# Patient Record
Sex: MALE | Race: WHITE | NOT HISPANIC OR LATINO | Employment: FULL TIME | URBAN - METROPOLITAN AREA
[De-identification: names, ages, dates, MRNs, and addresses within clinical notes are randomized per-mention and may not be internally consistent; named-entity substitution may affect disease eponyms.]

---

## 2019-08-28 ENCOUNTER — TRANSCRIBE ORDERS (OUTPATIENT)
Dept: ADMINISTRATIVE | Age: 49
End: 2019-08-28

## 2019-08-28 DIAGNOSIS — Z13.0 SCREENING FOR IRON DEFICIENCY ANEMIA: ICD-10-CM

## 2019-08-28 DIAGNOSIS — R73.01 IMPAIRED FASTING GLUCOSE: Primary | ICD-10-CM

## 2019-08-28 DIAGNOSIS — Z13.6 SCREENING FOR ISCHEMIC HEART DISEASE: ICD-10-CM

## 2019-08-30 ENCOUNTER — APPOINTMENT (OUTPATIENT)
Dept: LAB | Age: 49
End: 2019-08-30
Payer: COMMERCIAL

## 2019-08-30 DIAGNOSIS — Z13.0 SCREENING FOR IRON DEFICIENCY ANEMIA: ICD-10-CM

## 2019-08-30 DIAGNOSIS — R73.01 IMPAIRED FASTING GLUCOSE: ICD-10-CM

## 2019-08-30 DIAGNOSIS — Z13.6 SCREENING FOR ISCHEMIC HEART DISEASE: ICD-10-CM

## 2019-09-25 ENCOUNTER — EVALUATION (OUTPATIENT)
Dept: PHYSICAL THERAPY | Age: 49
End: 2019-09-25
Payer: COMMERCIAL

## 2019-09-25 ENCOUNTER — TRANSCRIBE ORDERS (OUTPATIENT)
Dept: PHYSICAL THERAPY | Age: 49
End: 2019-09-25

## 2019-09-25 DIAGNOSIS — M75.02 ADHESIVE CAPSULITIS OF LEFT SHOULDER: Primary | ICD-10-CM

## 2019-09-25 DIAGNOSIS — M75.02 ADHESIVE BURSITIS OF LEFT SHOULDER: Primary | ICD-10-CM

## 2019-09-25 PROCEDURE — 97161 PT EVAL LOW COMPLEX 20 MIN: CPT

## 2019-09-25 NOTE — LETTER
2019    [facility] [address]    Patient: Tori Zepeda   YOB: 1970   Date of Visit: 2019     Encounter Diagnosis     ICD-10-CM    1  Adhesive capsulitis of left shoulder M75 02        Dear Dr Lazaro Pimentel: Thank you for your recent referral of Tori Zepeda  Please review the attached evaluation summary from Sesar's recent visit  Please verify that you agree with the plan of care by signing the attached order  If you have any questions or concerns, please do not hesitate to call  I sincerely appreciate the opportunity to share in the care of one of your patients and hope to have another opportunity to work with you in the near future  Sincerely,    Sherri Rodrigues, PT      Referring Provider:      I certify that I have read the below Plan of Care and certify the need for these services furnished under this plan of treatment while under my care  Jewels Suggs DO  [address]  [place]: [phone]          PT Evaluation     Today's date: 2019  Patient name: Tori Zepeda  : 1970  MRN: [id number]  Referring provider: Radha Villarreal DO  Dx:   Encounter Diagnosis     ICD-10-CM    1  Adhesive capsulitis of left shoulder M75 02                   Assessment  Assessment details: Patient is a 52 y o  Male reporting to PT with complaints of L shoulder stiffness/pain  He presents with significantly limited L shoulder AROM (ER>ABD>IR), pain with end-range movements, and impaired ability to perform ADL's  No red flags present  L shoulder strength normal  Overall, presentation is consistent with adhesive capsulitis of the L shoulder  At this time, patient will benefit from skilled PT intervention to address the impairments listed     Impairments: abnormal or restricted ROM, abnormal movement, activity intolerance, lacks appropriate home exercise program, pain with function and poor body mechanics    Symptom irritability: moderateBarriers to therapy: None  Understanding of Dx/Px/POC: good   Prognosis: good    Goals  STG's: 4 Weeks  1 ) Patient will be independent with HEP   2 ) Patient will be able to reach for a overhead with <3/10 pain  3 ) Patient will be able to don/doff jacket without difficulty  4 ) Patient will be able to reach behind their back with <3/10 pain  5 ) Patient will be able to sleep on affected side throughout the night without waking  LTG's: 8 Weeks  1 ) Patient will demonstrate full shoulder AROM in all planes when compared to the uninvolved side  2 ) Patient will be able to lift 10-15 lbs overhead using affected arm with <3/10 pain  3 ) Patient will be able to perform work and/or recreational ADL's without difficulty  4 ) Patient will exceed predicted FOTO score  5 ) Patient will be able to perform all household chores without difficulty  Plan  Plan details: Despite recommendation for skilled PT, patient has deferred formal tx and will manage sx's independently via HEP  Patient has been encouraged to follow-up with PT if sx's do no improve over the next month or so  Patient was educated regarding the etiology and pathomechanics of their condition  They demonstrated understanding verbally  Patient was provided with an HEP  They were educated regarding repetitions, resistance, and proper technique  Patient demonstrated understanding verbally  Patient would benefit from: skilled physical therapy  Treatment plan discussed with: patient        Subjective Evaluation    History of Present Illness  Mechanism of injury: Patient is a 52 y o  Male reporting to PT with complaints of L shoulder pain  Onset ~1 year ago without incidence of trauma  Sx's have progressively worsened over that time  Patient was seen by PCP who referred him to PT  No imaging performed at this time  Sx's include lost AROM and pain   Patient denies presence of N/T extending down the arm or neck pain  No pain at rest, but significant onset with reaching behind the back  Greatest impairment is the patient's inability to abduct >90 degrees or fully reach behind his back due to stiffness  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  Patient denies significant PmHx verbally    Patient consented to manual therapy and physical examination  Demonstrated understanding verbally          Quality of life: poor    Pain  Current pain ratin  At best pain ratin  At worst pain rating: 3  Location: L Shoulder   Quality: discomfort  Relieving factors: change in position, relaxation, rest and support  Aggravating factors: lifting and overhead activity    Patient Goals  Patient goals for therapy: increased motion, decreased pain, increased strength and independence with ADLs/IADLs          Objective     Concurrent Complaints  Negative for night pain, disturbed sleep, dizziness, faints, headaches, nausea/motion sickness, tinnitus, trouble swallowing, difficulty breathing, shortness of breath, respiratory pain, visual change, cardiac problem, kidney problem, gallbladder problem, stomach problem, ulcer, appendix problem, spleen problem, pancreas problem, history of cancer, history of trauma and infection    Palpation     Additional Palpation Details  No point tenderness with palpation of shoulder musculature or bony structures      Neurological Testing     Sensation   Cervical/Thoracic   Left   Intact: light touch    Right   Intact: light touch    Reflexes   Left   Biceps (C5/C6): normal (2+)    Right   Biceps (C5/C6): normal (2+)    Active Range of Motion   Left Shoulder   Flexion: 134 degrees   Abduction: 100 degrees   External rotation 0°:  60 degrees   Internal rotation BTB: L1     Right Shoulder   Flexion: 160 degrees   Abduction: 165 degrees   External rotation 0°:  80 degrees   Internal rotation BTB: T10     Passive Range of Motion   Left Shoulder   Flexion: 140 degrees   Abduction: 100 degrees   External rotation 90°:  50 degrees   Internal rotation 90°:  45 degrees     Strength/Myotome Testing   Cervical Spine     Left   Interossei strength (t1): 5    Right   Interossei strength (t1): 5    Left Shoulder     Planes of Motion   Flexion: 5   Abduction: 5   External rotation at 0°:  5   Internal rotation at 0°:  5     Isolated Muscles   Upper trapezius: 5     Right Shoulder     Planes of Motion   Flexion: 5   Abduction: 5   External rotation at 0°:  5   Internal rotation at 0°:  5     Isolated Muscles   Upper trapezius: 5     Left Elbow   Flexion: 5  Extension: 5    Right Elbow   Flexion: 5  Extension: 5    Left Wrist/Hand   Wrist extension: 5  Wrist flexion: 5  Thumb extension: 5    Right Wrist/Hand   Wrist extension: 5  Wrist flexion: 5  Thumb extension: 5    Tests     Left Shoulder   Negative drop arm, empty can, Hawkin's and Neer's       Additional Tests Details  No pain with resisted ER    General Comments:    Upper quarter screen   Shoulder: unremarkable  Elbow: unremarkable  Hand/wrist: unremarkable  Neuro Exam:     Headaches   Patient reports headaches: No        Flowsheet Rows      Most Recent Value   PT/OT G-Codes   Current Score  66   Projected Score  77             Precautions: None

## 2019-09-25 NOTE — PROGRESS NOTES
PT Evaluation     Today's date: 2019  Patient name: Brandi Baron  : 1970  MRN: [id number]  Referring provider: Shanika Vanegas DO  Dx:   Encounter Diagnosis     ICD-10-CM    1  Adhesive capsulitis of left shoulder M75 02                   Assessment  Assessment details: Patient is a 52 y o  Male reporting to PT with complaints of L shoulder stiffness/pain  He presents with significantly limited L shoulder AROM (ER>ABD>IR), pain with end-range movements, and impaired ability to perform ADL's  No red flags present  L shoulder strength normal  Overall, presentation is consistent with adhesive capsulitis of the L shoulder  At this time, patient will benefit from skilled PT intervention to address the impairments listed  Impairments: abnormal or restricted ROM, abnormal movement, activity intolerance, lacks appropriate home exercise program, pain with function and poor body mechanics    Symptom irritability: moderateBarriers to therapy: None  Understanding of Dx/Px/POC: good   Prognosis: good    Goals  STG's: 4 Weeks  1 ) Patient will be independent with HEP   2 ) Patient will be able to reach for a overhead with <3/10 pain  3 ) Patient will be able to don/doff jacket without difficulty  4 ) Patient will be able to reach behind their back with <3/10 pain  5 ) Patient will be able to sleep on affected side throughout the night without waking  LTG's: 8 Weeks  1 ) Patient will demonstrate full shoulder AROM in all planes when compared to the uninvolved side  2 ) Patient will be able to lift 10-15 lbs overhead using affected arm with <3/10 pain  3 ) Patient will be able to perform work and/or recreational ADL's without difficulty  4 ) Patient will exceed predicted FOTO score  5 ) Patient will be able to perform all household chores without difficulty  Plan  Plan details: Despite recommendation for skilled PT, patient has deferred formal tx and will manage sx's independently via HEP   Patient has been encouraged to follow-up with PT if sx's do no improve over the next month or so  Patient was educated regarding the etiology and pathomechanics of their condition  They demonstrated understanding verbally  Patient was provided with an HEP  They were educated regarding repetitions, resistance, and proper technique  Patient demonstrated understanding verbally  Patient would benefit from: skilled physical therapy  Treatment plan discussed with: patient        Subjective Evaluation    History of Present Illness  Mechanism of injury: Patient is a 52 y o  Male reporting to PT with complaints of L shoulder pain  Onset ~1 year ago without incidence of trauma  Sx's have progressively worsened over that time  Patient was seen by PCP who referred him to PT  No imaging performed at this time  Sx's include lost AROM and pain  Patient denies presence of N/T extending down the arm or neck pain  No pain at rest, but significant onset with reaching behind the back  Greatest impairment is the patient's inability to abduct >90 degrees or fully reach behind his back due to stiffness  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  Patient denies significant PmHx verbally    Patient consented to manual therapy and physical examination  Demonstrated understanding verbally          Quality of life: poor    Pain  Current pain ratin  At best pain ratin  At worst pain rating: 3  Location: L Shoulder   Quality: discomfort  Relieving factors: change in position, relaxation, rest and support  Aggravating factors: lifting and overhead activity    Patient Goals  Patient goals for therapy: increased motion, decreased pain, increased strength and independence with ADLs/IADLs          Objective     Concurrent Complaints  Negative for night pain, disturbed sleep, dizziness, faints, headaches, nausea/motion sickness, tinnitus, trouble swallowing, difficulty breathing, shortness of breath, respiratory pain, visual change, cardiac problem, kidney problem, gallbladder problem, stomach problem, ulcer, appendix problem, spleen problem, pancreas problem, history of cancer, history of trauma and infection    Palpation     Additional Palpation Details  No point tenderness with palpation of shoulder musculature or bony structures      Neurological Testing     Sensation   Cervical/Thoracic   Left   Intact: light touch    Right   Intact: light touch    Reflexes   Left   Biceps (C5/C6): normal (2+)    Right   Biceps (C5/C6): normal (2+)    Active Range of Motion   Left Shoulder   Flexion: 134 degrees   Abduction: 100 degrees   External rotation 0°: 60 degrees   Internal rotation BTB: L1     Right Shoulder   Flexion: 160 degrees   Abduction: 165 degrees   External rotation 0°: 80 degrees   Internal rotation BTB: T10     Passive Range of Motion   Left Shoulder   Flexion: 140 degrees   Abduction: 100 degrees   External rotation 90°: 50 degrees   Internal rotation 90°: 45 degrees     Strength/Myotome Testing   Cervical Spine     Left   Interossei strength (t1): 5    Right   Interossei strength (t1): 5    Left Shoulder     Planes of Motion   Flexion: 5   Abduction: 5   External rotation at 0°: 5   Internal rotation at 0°: 5     Isolated Muscles   Upper trapezius: 5     Right Shoulder     Planes of Motion   Flexion: 5   Abduction: 5   External rotation at 0°: 5   Internal rotation at 0°: 5     Isolated Muscles   Upper trapezius: 5     Left Elbow   Flexion: 5  Extension: 5    Right Elbow   Flexion: 5  Extension: 5    Left Wrist/Hand   Wrist extension: 5  Wrist flexion: 5  Thumb extension: 5    Right Wrist/Hand   Wrist extension: 5  Wrist flexion: 5  Thumb extension: 5    Tests     Left Shoulder   Negative drop arm, empty can, Hawkin's and Neer's       Additional Tests Details  No pain with resisted ER    General Comments:    Upper quarter screen   Shoulder: unremarkable  Elbow: unremarkable  Hand/wrist: unremarkable  Neuro Exam:     Headaches   Patient reports headaches: No        Flowsheet Rows      Most Recent Value   PT/OT G-Codes   Current Score  66   Projected Score  77             Precautions: None

## 2020-02-28 ENCOUNTER — TRANSCRIBE ORDERS (OUTPATIENT)
Dept: ADMINISTRATIVE | Age: 50
End: 2020-02-28

## 2020-02-28 ENCOUNTER — APPOINTMENT (OUTPATIENT)
Dept: LAB | Age: 50
End: 2020-02-28
Payer: COMMERCIAL

## 2020-02-28 DIAGNOSIS — E78.5 HYPERLIPIDEMIA, UNSPECIFIED HYPERLIPIDEMIA TYPE: ICD-10-CM

## 2020-02-28 DIAGNOSIS — E78.5 HYPERLIPIDEMIA, UNSPECIFIED HYPERLIPIDEMIA TYPE: Primary | ICD-10-CM

## 2020-02-28 LAB
CHOLEST SERPL-MCNC: 209 MG/DL (ref 50–200)
HDLC SERPL-MCNC: 42 MG/DL
LDLC SERPL CALC-MCNC: 150 MG/DL (ref 0–100)
NONHDLC SERPL-MCNC: 167 MG/DL
TRIGL SERPL-MCNC: 84 MG/DL

## 2020-02-28 PROCEDURE — 80061 LIPID PANEL: CPT

## 2020-02-28 PROCEDURE — 36415 COLL VENOUS BLD VENIPUNCTURE: CPT

## 2020-09-03 ENCOUNTER — TRANSCRIBE ORDERS (OUTPATIENT)
Dept: ADMINISTRATIVE | Age: 50
End: 2020-09-03

## 2020-09-03 ENCOUNTER — APPOINTMENT (OUTPATIENT)
Dept: LAB | Age: 50
End: 2020-09-03
Payer: COMMERCIAL

## 2020-09-03 DIAGNOSIS — R73.01 IMPAIRED FASTING GLUCOSE: Primary | ICD-10-CM

## 2020-09-03 DIAGNOSIS — Z12.5 SPECIAL SCREENING FOR MALIGNANT NEOPLASM OF PROSTATE: ICD-10-CM

## 2020-09-03 DIAGNOSIS — Z13.6 SCREENING FOR ISCHEMIC HEART DISEASE: ICD-10-CM

## 2020-09-03 DIAGNOSIS — R73.01 IMPAIRED FASTING GLUCOSE: ICD-10-CM

## 2020-09-03 DIAGNOSIS — Z13.0 SCREENING FOR IRON DEFICIENCY ANEMIA: ICD-10-CM

## 2020-09-03 LAB
ALBUMIN SERPL BCP-MCNC: 3.9 G/DL (ref 3.5–5)
ALP SERPL-CCNC: 51 U/L (ref 46–116)
ALT SERPL W P-5'-P-CCNC: 26 U/L (ref 12–78)
ANION GAP SERPL CALCULATED.3IONS-SCNC: 1 MMOL/L (ref 4–13)
AST SERPL W P-5'-P-CCNC: 22 U/L (ref 5–45)
BILIRUB SERPL-MCNC: 0.58 MG/DL (ref 0.2–1)
BUN SERPL-MCNC: 17 MG/DL (ref 5–25)
CALCIUM SERPL-MCNC: 8.8 MG/DL (ref 8.3–10.1)
CHLORIDE SERPL-SCNC: 108 MMOL/L (ref 100–108)
CHOLEST SERPL-MCNC: 170 MG/DL (ref 50–200)
CO2 SERPL-SCNC: 28 MMOL/L (ref 21–32)
CREAT SERPL-MCNC: 1.07 MG/DL (ref 0.6–1.3)
EST. AVERAGE GLUCOSE BLD GHB EST-MCNC: 111 MG/DL
GFR SERPL CREATININE-BSD FRML MDRD: 81 ML/MIN/1.73SQ M
GLUCOSE P FAST SERPL-MCNC: 97 MG/DL (ref 65–99)
HBA1C MFR BLD: 5.5 %
HDLC SERPL-MCNC: 50 MG/DL
LDLC SERPL CALC-MCNC: 112 MG/DL (ref 0–100)
NONHDLC SERPL-MCNC: 120 MG/DL
POTASSIUM SERPL-SCNC: 4.5 MMOL/L (ref 3.5–5.3)
PROT SERPL-MCNC: 7.3 G/DL (ref 6.4–8.2)
PSA SERPL-MCNC: 0.5 NG/ML (ref 0–4)
SODIUM SERPL-SCNC: 137 MMOL/L (ref 136–145)
TRIGL SERPL-MCNC: 42 MG/DL

## 2020-09-03 PROCEDURE — 80053 COMPREHEN METABOLIC PANEL: CPT

## 2020-09-03 PROCEDURE — G0103 PSA SCREENING: HCPCS

## 2020-09-03 PROCEDURE — 80061 LIPID PANEL: CPT

## 2020-09-03 PROCEDURE — 36415 COLL VENOUS BLD VENIPUNCTURE: CPT

## 2020-09-03 PROCEDURE — 83036 HEMOGLOBIN GLYCOSYLATED A1C: CPT

## 2021-03-20 ENCOUNTER — IMMUNIZATIONS (OUTPATIENT)
Dept: FAMILY MEDICINE CLINIC | Facility: HOSPITAL | Age: 51
End: 2021-03-20

## 2021-03-20 DIAGNOSIS — Z23 ENCOUNTER FOR IMMUNIZATION: Primary | ICD-10-CM

## 2021-03-20 PROCEDURE — 0001A SARS-COV-2 / COVID-19 MRNA VACCINE (PFIZER-BIONTECH) 30 MCG: CPT

## 2021-03-20 PROCEDURE — 91300 SARS-COV-2 / COVID-19 MRNA VACCINE (PFIZER-BIONTECH) 30 MCG: CPT

## 2021-04-10 ENCOUNTER — IMMUNIZATIONS (OUTPATIENT)
Dept: FAMILY MEDICINE CLINIC | Facility: HOSPITAL | Age: 51
End: 2021-04-10

## 2021-04-10 DIAGNOSIS — Z23 ENCOUNTER FOR IMMUNIZATION: Primary | ICD-10-CM

## 2021-04-10 PROCEDURE — 91300 SARS-COV-2 / COVID-19 MRNA VACCINE (PFIZER-BIONTECH) 30 MCG: CPT

## 2021-04-10 PROCEDURE — 0002A SARS-COV-2 / COVID-19 MRNA VACCINE (PFIZER-BIONTECH) 30 MCG: CPT

## 2021-08-30 ENCOUNTER — APPOINTMENT (OUTPATIENT)
Dept: LAB | Age: 51
End: 2021-08-30
Payer: COMMERCIAL

## 2021-08-30 DIAGNOSIS — Z00.00 ROUTINE GENERAL MEDICAL EXAMINATION AT A HEALTH CARE FACILITY: ICD-10-CM

## 2021-08-30 LAB
ALBUMIN SERPL BCP-MCNC: 3.5 G/DL (ref 3.5–5)
ALP SERPL-CCNC: 52 U/L (ref 46–116)
ALT SERPL W P-5'-P-CCNC: 28 U/L (ref 12–78)
ANION GAP SERPL CALCULATED.3IONS-SCNC: 2 MMOL/L (ref 4–13)
AST SERPL W P-5'-P-CCNC: 24 U/L (ref 5–45)
BILIRUB SERPL-MCNC: 0.36 MG/DL (ref 0.2–1)
BUN SERPL-MCNC: 22 MG/DL (ref 5–25)
CALCIUM SERPL-MCNC: 8.6 MG/DL (ref 8.3–10.1)
CHLORIDE SERPL-SCNC: 110 MMOL/L (ref 100–108)
CHOLEST SERPL-MCNC: 148 MG/DL (ref 50–200)
CO2 SERPL-SCNC: 26 MMOL/L (ref 21–32)
CREAT SERPL-MCNC: 1.04 MG/DL (ref 0.6–1.3)
GFR SERPL CREATININE-BSD FRML MDRD: 83 ML/MIN/1.73SQ M
GLUCOSE P FAST SERPL-MCNC: 97 MG/DL (ref 65–99)
HDLC SERPL-MCNC: 38 MG/DL
LDLC SERPL CALC-MCNC: 91 MG/DL (ref 0–100)
NONHDLC SERPL-MCNC: 110 MG/DL
POTASSIUM SERPL-SCNC: 4.3 MMOL/L (ref 3.5–5.3)
PROT SERPL-MCNC: 7 G/DL (ref 6.4–8.2)
SODIUM SERPL-SCNC: 138 MMOL/L (ref 136–145)
TRIGL SERPL-MCNC: 93 MG/DL

## 2021-08-30 PROCEDURE — 36415 COLL VENOUS BLD VENIPUNCTURE: CPT

## 2021-08-30 PROCEDURE — 80061 LIPID PANEL: CPT

## 2021-08-30 PROCEDURE — 80053 COMPREHEN METABOLIC PANEL: CPT

## 2023-05-12 ENCOUNTER — APPOINTMENT (OUTPATIENT)
Dept: LAB | Age: 53
End: 2023-05-12

## 2023-05-12 DIAGNOSIS — Z12.5 PROSTATE CANCER SCREENING: ICD-10-CM

## 2023-05-12 DIAGNOSIS — Z00.00 ROUTINE MEDICAL EXAM: ICD-10-CM

## 2023-05-12 DIAGNOSIS — E78.5 HYPERLIPIDEMIA, UNSPECIFIED HYPERLIPIDEMIA TYPE: ICD-10-CM

## 2023-05-12 LAB
ALBUMIN SERPL BCP-MCNC: 3.9 G/DL (ref 3.5–5)
ALP SERPL-CCNC: 56 U/L (ref 46–116)
ALT SERPL W P-5'-P-CCNC: 37 U/L (ref 12–78)
ANION GAP SERPL CALCULATED.3IONS-SCNC: 2 MMOL/L (ref 4–13)
AST SERPL W P-5'-P-CCNC: 31 U/L (ref 5–45)
BILIRUB SERPL-MCNC: 0.57 MG/DL (ref 0.2–1)
BUN SERPL-MCNC: 15 MG/DL (ref 5–25)
CALCIUM SERPL-MCNC: 9 MG/DL (ref 8.3–10.1)
CHLORIDE SERPL-SCNC: 108 MMOL/L (ref 96–108)
CHOLEST SERPL-MCNC: 173 MG/DL
CO2 SERPL-SCNC: 27 MMOL/L (ref 21–32)
CREAT SERPL-MCNC: 1.13 MG/DL (ref 0.6–1.3)
GFR SERPL CREATININE-BSD FRML MDRD: 74 ML/MIN/1.73SQ M
GLUCOSE P FAST SERPL-MCNC: 109 MG/DL (ref 65–99)
HDLC SERPL-MCNC: 46 MG/DL
LDLC SERPL CALC-MCNC: 117 MG/DL (ref 0–100)
NONHDLC SERPL-MCNC: 127 MG/DL
POTASSIUM SERPL-SCNC: 4 MMOL/L (ref 3.5–5.3)
PROT SERPL-MCNC: 7.5 G/DL (ref 6.4–8.4)
SODIUM SERPL-SCNC: 137 MMOL/L (ref 135–147)
TRIGL SERPL-MCNC: 49 MG/DL

## 2024-05-11 ENCOUNTER — APPOINTMENT (OUTPATIENT)
Dept: LAB | Age: 54
End: 2024-05-11
Payer: COMMERCIAL

## 2024-05-11 DIAGNOSIS — E78.5 HYPERLIPIDEMIA, UNSPECIFIED HYPERLIPIDEMIA TYPE: ICD-10-CM

## 2024-05-11 DIAGNOSIS — Z00.01 ENCOUNTER FOR GENERAL ADULT MEDICAL EXAMINATION WITH ABNORMAL FINDINGS: ICD-10-CM

## 2024-05-11 DIAGNOSIS — N13.8 ENLARGED PROSTATE WITH URINARY OBSTRUCTION: ICD-10-CM

## 2024-05-11 DIAGNOSIS — N40.1 ENLARGED PROSTATE WITH URINARY OBSTRUCTION: ICD-10-CM

## 2024-05-11 DIAGNOSIS — R73.01 IMPAIRED FASTING GLUCOSE: ICD-10-CM

## 2024-05-11 LAB
ALBUMIN SERPL BCP-MCNC: 4.1 G/DL (ref 3.5–5)
ALP SERPL-CCNC: 46 U/L (ref 34–104)
ALT SERPL W P-5'-P-CCNC: 16 U/L (ref 7–52)
ANION GAP SERPL CALCULATED.3IONS-SCNC: 7 MMOL/L (ref 4–13)
AST SERPL W P-5'-P-CCNC: 21 U/L (ref 13–39)
BILIRUB SERPL-MCNC: 0.36 MG/DL (ref 0.2–1)
BUN SERPL-MCNC: 17 MG/DL (ref 5–25)
CALCIUM SERPL-MCNC: 9.2 MG/DL (ref 8.4–10.2)
CHLORIDE SERPL-SCNC: 105 MMOL/L (ref 96–108)
CHOLEST SERPL-MCNC: 158 MG/DL
CO2 SERPL-SCNC: 28 MMOL/L (ref 21–32)
CREAT SERPL-MCNC: 1.03 MG/DL (ref 0.6–1.3)
EST. AVERAGE GLUCOSE BLD GHB EST-MCNC: 111 MG/DL
GFR SERPL CREATININE-BSD FRML MDRD: 82 ML/MIN/1.73SQ M
GLUCOSE P FAST SERPL-MCNC: 100 MG/DL (ref 65–99)
HBA1C MFR BLD: 5.5 %
HDLC SERPL-MCNC: 49 MG/DL
LDLC SERPL CALC-MCNC: 98 MG/DL (ref 0–100)
NONHDLC SERPL-MCNC: 109 MG/DL
POTASSIUM SERPL-SCNC: 4.5 MMOL/L (ref 3.5–5.3)
PROT SERPL-MCNC: 6.9 G/DL (ref 6.4–8.4)
PSA SERPL-MCNC: 0.43 NG/ML (ref 0–4)
SODIUM SERPL-SCNC: 140 MMOL/L (ref 135–147)
TRIGL SERPL-MCNC: 55 MG/DL

## 2024-05-11 PROCEDURE — 80061 LIPID PANEL: CPT

## 2024-05-11 PROCEDURE — 83036 HEMOGLOBIN GLYCOSYLATED A1C: CPT

## 2024-05-11 PROCEDURE — 80053 COMPREHEN METABOLIC PANEL: CPT

## 2024-05-11 PROCEDURE — G0103 PSA SCREENING: HCPCS

## 2024-05-11 PROCEDURE — 36415 COLL VENOUS BLD VENIPUNCTURE: CPT

## 2024-11-26 ENCOUNTER — TELEPHONE (OUTPATIENT)
Age: 54
End: 2024-11-26

## 2024-11-26 ENCOUNTER — PREP FOR PROCEDURE (OUTPATIENT)
Age: 54
End: 2024-11-26

## 2024-11-26 DIAGNOSIS — Z86.0100 HISTORY OF COLON POLYPS: Primary | ICD-10-CM

## 2024-11-26 NOTE — TELEPHONE ENCOUNTER
11/26/24  Screened by: Falguni Sarmiento    Referring Provider     Pre- Screening:     There is no height or weight on file to calculate BMI.190lbs 27.3  Has patient been referred for a routine screening Colonoscopy? yes  Is the patient between 45-75 years old? yes      Previous Colonoscopy yes   If yes: 3 years    Date:     Facility:     Reason:     Does the patient want to see a Gastroenterologist prior to their procedure OR are they having any GI symptoms? no    Has the patient been hospitalized or had abdominal surgery in the past 6 months? no    Does the patient use supplemental oxygen? no    Does the patient take Coumadin, Lovenox, Plavix, Elliquis, Xarelto, or other blood thinning medication? no    Has the patient had a stroke, cardiac event, or stent placed in the past year? no        If patient is between 45yrs - 49yrs, please advise patient that we will have to confirm benefits & coverage with their insurance company for a routine screening colonoscopy.

## 2024-11-26 NOTE — TELEPHONE ENCOUNTER
Scheduled date of colonoscopy (as of today):1/7/2025  Physician performing colonoscopy:Dr Cagle  Location of colonoscopy:[facility]  Bowel prep reviewed with patient:Miralax/Dulcolax  Instructions reviewed with patient by:sent via letter  Clearances: N/A  ASC Screening    ASC Screening  BMI > than 45: No  Are you currently pregnant?: No  Do you rely on a wheelchair for mobility?: No  Do you need oxygen during the day?: No  Have you ever been informed by anesthesia that you have a difficult airway?: No  Have you been diagnosed with End Stage Renal Disease (ESRD)?: No  Are you actively on dialysis?: No  Have you been diagnosed with Pulmonary Hypertension?: No  Do you have a pacemaker or an Automatic Implantable Cardioverter Defibrillator (AICD)?: No  Have you ever had an organ transplant?: No  Have you had a stroke, heart attack, myocardial infarction (MI) within the last 6 months?: No  Have you ever been diagnosed with Aortic Stenosis?: No  Have you ever been diagnosed  with Congestive Heart Failure?: No  Have you ever been diagnosed with a heart valve disease?: No  Are you Diabetic?: No  If you are Diabetic, has your A1C been greater than 12 within the last six months?: N/A

## 2024-11-26 NOTE — LETTER
Attached are your prep instructions for your upcoming procedure on 1/7/2025. If you have any questions or concerns please contact us at [phone].          Medicine Instructions for Adults with Diabetes who Need a Bowel Prep       Follow these instructions when a BOWEL PREP is required for your procedure or surgery!    NOTE:   GLP-1 Agonists taken weekly: do not take in the 7 days before your procedure   SGLT-2 Inhibitors: do not take in the 4 days before your procedure     On the Day Before Surgery/Procedure  If you are having a procedure (e.g. Colonoscopy) or surgery that requires a bowel prep and you may have at least a clear liquid diet, follow the directions below based on the type of medicine you take for your diabetes.     Type of Medicine You Take Examples What to do   Pre-Mixed Insulin - Intermediate Acting Humalog® 75/25, Humulin® 70/30, Novolog® 70/30, Regular Insulin Take ½ your regular dose the evening before your procedure   Rapid/Fast Acting Insulin Humalog® U200, NovoLog®, Apidra®, Fiasp® Take ½ your regular dose the evening before your procedure.   Long-Acting Insulin Lantus®, Levemir®, Tresiba®, Toujeo®, Basaglar® Take your FULL regular dose the day before procedure   Oral Sulfonylurea Glipizide/Glimepiride/Glucotrol® Take ½ your regular dose the evening before your procedure   Other Oral Diabetes Medicines Metformin®, Glucophage®, Glucophage XR®, Riomet®, Glumetza®), Actose®, Avandia®, Glyset®, Prandin® Take your regular dose with dinner in the evening before your procedure   GLP-1 Agonists AdlyxinÒ, ByettaÒ, BydureonÒ, OzempicÒ, SoliquaÒ, TanzeumÒ, TrulicityÒ, VictozaÒ, Saxenda®, Rybelsus® If taken daily, take as normal    If taken weekly, do not take this medicine for 7 days before your procedure including the day of the procedure (resume taking after the procedure)   SGLT-2 Inhibitors Jardiance®, Invokana®, Farxiga®,   Steglatro®, Brenzavvy®, Qtern®, Segluromet®, Glyxambi®, Synjardy®,  Synjardy XR®, Invokamet®, Invokamet XR®, Trijary XR®, Xigduo XR®, Steglujan® Do not take for 4 days before your procedure including the day of the procedure (resume taking after the procedure)                More information continued on back                    Medicine Instructions for Adults with Diabetes who Need a Bowel Prep  Page 2      On the Day of Surgery/Procedure  Follow the directions below based on the type of medicine you take for your diabetes.     Type of Medicine You Take Examples What to do   Long-Acting Insulin Lantus®, Levemir®, Tresiba®, Toujeo®, Basaglar®, Semglee®   If you usually take your Long-Acting Insulin in the morning, take the full dose as scheduled.   GLP-1 Agonists AdlyxinÒ, ByettaÒ, BydureonÒ, OzempicÒ, SoliquaÒ, TanzeumÒ, TrulicityÒ, VictozaÒ, Saxenda®, Rybelsus® Do NOT take this medicine on the day of your procedure (resume taking after the procedure)       On the Day of Surgery/Procedure (continued)  Except for the morning Long-Acting Insulin, DO NOT take ANY diabetic medicine on the day of your procedure unless you were instructed by the doctor who manages your diabetes medicines.    Continue to check your blood sugars.  If you have an insulin pump, ask your endocrinologist for instructions at least 3 days before your procedure. NOTE: If you are not able to ask your endocrinologist in advance, on the day of the procedure set your insulin pump to your basal rate only. Bring your insulin pump supplies to the hospital.     If you have any questions about taking your diabetes medicines prior to your procedure, please contact the doctor who manages your diabetes medicines.    Thank you,     [facility]

## 2025-01-03 ENCOUNTER — TELEPHONE (OUTPATIENT)
Age: 55
End: 2025-01-03

## 2025-01-03 NOTE — TELEPHONE ENCOUNTER
Spoke w/PT to confirm 1/7/25 colonoscopy, emailed PT prep instructions to email address in chart.  PT aware he will receive call day prior with arrival time.

## 2025-01-07 ENCOUNTER — ANESTHESIA (OUTPATIENT)
Dept: GASTROENTEROLOGY | Facility: HOSPITAL | Age: 55
End: 2025-01-07
Payer: COMMERCIAL

## 2025-01-07 ENCOUNTER — HOSPITAL ENCOUNTER (OUTPATIENT)
Dept: GASTROENTEROLOGY | Facility: HOSPITAL | Age: 55
Setting detail: OUTPATIENT SURGERY
Discharge: HOME | End: 2025-01-07
Attending: COLON & RECTAL SURGERY
Payer: COMMERCIAL

## 2025-01-07 ENCOUNTER — ANESTHESIA EVENT (OUTPATIENT)
Dept: GASTROENTEROLOGY | Facility: HOSPITAL | Age: 55
End: 2025-01-07
Payer: COMMERCIAL

## 2025-01-07 VITALS
TEMPERATURE: 96.1 F | HEART RATE: 73 BPM | RESPIRATION RATE: 16 BRPM | DIASTOLIC BLOOD PRESSURE: 80 MMHG | OXYGEN SATURATION: 97 % | SYSTOLIC BLOOD PRESSURE: 124 MMHG

## 2025-01-07 DIAGNOSIS — Z86.0100 HISTORY OF COLON POLYPS: ICD-10-CM

## 2025-01-07 PROCEDURE — 45380 COLONOSCOPY AND BIOPSY: CPT | Performed by: COLON & RECTAL SURGERY

## 2025-01-07 PROCEDURE — 88305 TISSUE EXAM BY PATHOLOGIST: CPT | Performed by: PATHOLOGY

## 2025-01-07 RX ORDER — ATORVASTATIN CALCIUM 10 MG/1
10 TABLET, FILM COATED ORAL DAILY
COMMUNITY

## 2025-01-07 RX ORDER — PROPOFOL 10 MG/ML
INJECTION, EMULSION INTRAVENOUS AS NEEDED
Status: DISCONTINUED | OUTPATIENT
Start: 2025-01-07 | End: 2025-01-07

## 2025-01-07 RX ORDER — LIDOCAINE HYDROCHLORIDE 10 MG/ML
INJECTION, SOLUTION EPIDURAL; INFILTRATION; INTRACAUDAL; PERINEURAL AS NEEDED
Status: DISCONTINUED | OUTPATIENT
Start: 2025-01-07 | End: 2025-01-07

## 2025-01-07 RX ORDER — GLYCOPYRROLATE 0.2 MG/ML
INJECTION INTRAMUSCULAR; INTRAVENOUS AS NEEDED
Status: DISCONTINUED | OUTPATIENT
Start: 2025-01-07 | End: 2025-01-07

## 2025-01-07 RX ORDER — SODIUM CHLORIDE 9 MG/ML
INJECTION, SOLUTION INTRAVENOUS CONTINUOUS PRN
Status: DISCONTINUED | OUTPATIENT
Start: 2025-01-07 | End: 2025-01-07

## 2025-01-07 RX ADMIN — PROPOFOL 50 MG: 10 INJECTION, EMULSION INTRAVENOUS at 11:07

## 2025-01-07 RX ADMIN — PROPOFOL 150 MG: 10 INJECTION, EMULSION INTRAVENOUS at 11:01

## 2025-01-07 RX ADMIN — PROPOFOL 50 MG: 10 INJECTION, EMULSION INTRAVENOUS at 11:04

## 2025-01-07 RX ADMIN — GLYCOPYRROLATE 0.1 MG: 0.2 INJECTION, SOLUTION INTRAMUSCULAR; INTRAVENOUS at 11:07

## 2025-01-07 RX ADMIN — PROPOFOL 50 MG: 10 INJECTION, EMULSION INTRAVENOUS at 11:08

## 2025-01-07 RX ADMIN — PROPOFOL 50 MG: 10 INJECTION, EMULSION INTRAVENOUS at 11:10

## 2025-01-07 RX ADMIN — SODIUM CHLORIDE: 9 INJECTION, SOLUTION INTRAVENOUS at 10:59

## 2025-01-07 RX ADMIN — PROPOFOL 50 MG: 10 INJECTION, EMULSION INTRAVENOUS at 11:12

## 2025-01-07 RX ADMIN — PROPOFOL 50 MG: 10 INJECTION, EMULSION INTRAVENOUS at 11:03

## 2025-01-07 RX ADMIN — LIDOCAINE HYDROCHLORIDE 50 MG: 10 INJECTION, SOLUTION EPIDURAL; INFILTRATION; INTRACAUDAL; PERINEURAL at 11:01

## 2025-01-07 RX ADMIN — PROPOFOL 50 MG: 10 INJECTION, EMULSION INTRAVENOUS at 11:05

## 2025-01-07 NOTE — ANESTHESIA POSTPROCEDURE EVALUATION
Post-Op Assessment Note    CV Status:  Stable  Pain Score: 0    Pain management: adequate       Mental Status:  Sleepy and arousable   Hydration Status:  Euvolemic   PONV Controlled:  Controlled   Airway Patency:  Patent     Post Op Vitals Reviewed: Yes    No anethesia notable event occurred.    Staff: CRNA           Last Filed PACU Vitals:  Vitals Value Taken Time   Temp 96.7    Pulse 72    BP 13/66    Resp 14    SpO2 95% RA

## 2025-01-07 NOTE — H&P
History and Physical   Colon and Rectal Surgery   Sesar Damico 54 y.o. male MRN: [id number]  Unit/Bed#:  Encounter: [id number]  01/07/25   @NOW    No chief complaint on file.        History of Present Illness   HPI:  Sesar Damico is a 54 y.o. male who presents for screening colonoscopy for personal history of polyps and family history of colorectal cancer.      Historical Information   No past medical history on file.  No past surgical history on file.    Meds/Allergies     Not in a hospital admission.    No current outpatient medications on file.    Not on File      Social History   Social History     Substance and Sexual Activity   Alcohol Use Not on file     Social History     Substance and Sexual Activity   Drug Use Not on file     Social History     Tobacco Use   Smoking Status Not on file   Smokeless Tobacco Not on file         Family History: No family history on file.      Objective     Current Vitals:      No intake or output data in the 24 hours ending 01/07/25 1023    Physical Exam:  General:  Resting comfortably in bed   Eyes:Sclera anicteric  ENT: Trachea midline  Pulm:  Symmetric chest raise.  No respiratory Distress  CV:  Regular on monitor  Abdomen:  Soft NT ND  Extremities:  No clubbing/ cyanosis/ edema    Lab Results: I have personally reviewed pertinent lab results.    Imaging: Results Review Statement: No pertinent imaging studies reviewed.      ASSESSMENT:  Sesar Damico is a 54 y.o. male who presents for outpatient colonoscopy.      PLAN:  For colonoscopy    Risks/ Benefits reviewed to include but not limited to anesthesia, bleeding, missed lesions, and colonoscopic perforation requiring surgery.

## 2025-01-09 PROCEDURE — 88305 TISSUE EXAM BY PATHOLOGIST: CPT | Performed by: PATHOLOGY

## 2025-01-10 ENCOUNTER — RESULTS FOLLOW-UP (OUTPATIENT)
Dept: OTHER | Facility: HOSPITAL | Age: 55
End: 2025-01-10

## 2025-01-10 NOTE — LETTER
January 10, 2025     Sesar Damico  [address]  [place] [address]      Dear Mr. Damico:    Below are the results from your recent visit: colonoscopy    Resulted Orders   Tissue Exam   Result Value Ref Range    Case Report       Surgical Pathology Report                         Case: [id number]                                  Authorizing Provider:  Scooby Nixon MD   Collected:           01/07/2025 1109              Ordering Location:     [facility]       Received:            01/07/2025 [facility]                                     Hospital Endoscopy                                                           Pathologist:           Scooby Cantrell DO                                                            Specimens:   A) - Polyp, Colorectal, hepatic flexure polyp, cold bx                                              B) - Polyp, Colorectal, descending colon polyp, cold bx                                    Final Diagnosis       A. Colon, hepatic flexure, polyp, polypectomy:  -Tubular adenoma, negative for high grade dysplasia.     B. Descending colon, polyp, polypectomy:  -Colonic mucosa with prominent lymphoid aggregate.       Additional Information       All reported additional testing was performed with appropriately reactive controls.  These tests were developed and their performance characteristics determined by [facility] or appropriate performing facility, though some tests may be performed on tissues which have not been validated for performance characteristics (such as staining performed on alcohol exposed cell blocks and decalcified tissues).  Results should be interpreted with caution and in the context of the patients’ clinical condition. These tests may not be cleared or approved by the U.S. Food and Drug Administration, though the FDA has determined that such clearance or approval is not necessary. These tests are used for clinical purposes and they should not be  "regarded as investigational or for research. This laboratory has been approved by CLIA 88, designated as a high-complexity laboratory and is qualified to perform these tests.    Interpretation performed at [facility] [address], [place] [address]      Synoptic Checklist          COLON/RECTUM POLYP FORM - GI - All Specimens          :    Adenoma(s)      Gross Description       A. The specimen is received in formalin, labeled with the patient's name and hospital number, and is designated \" hepatic flexure polyp\".  The specimen consists of a single tan tissue fragment measuring 0.5 x 0.4 x 0.1 cm.  Entirely submitted.  One screened cassette.    B. The specimen is received in formalin, labeled with the patient's name and hospital number, and is designated \" descending colon polyp\".  The specimen consists of a single tan flat and elongated tissue fragment measuring 0.7 x 0.3 x 0.1 cm.  The specimen is entirely submitted in a screened cassette.    Note: The estimated total formalin fixation time based upon information provided by the submitting clinician and the standard processing schedule is under 72 hours. Ada Ding       The test results show that your above results are benign adenoma    If you have any questions or concerns, please don't hesitate to call.         Sincerely,        Scooby Nixon MD  "

## 2025-04-24 NOTE — PROGRESS NOTES
Orthopaedic Surgery - Office Note  Sesar Damico (54 y.o. male)   : 1970   MRN: [id number]  Encounter Date: 2025    Chief Complaint   Patient presents with    Right Hand - Pain         Assessment & Plan  Dog bite, sequela 2024  The diagnosis as well as treatment options were reviewed with the patient in the office today.  Due to the concern of tendon disruption/injury after multiple puncture wounds from a dog bite 6 months ago I would recommend an MRI to rule out pathology.  In the meantime he will be started in occupational therapy to try and work on restoring range of motion and function.  Patient will watch for signs of infection and seek immediate medical care if they develop.  I would recommend follow-up with a hand surgeon to direct best next step treatment options.  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    Orders:    MRI wrist right wo contrast; Future    Ambulatory Referral to Occupational Therapy; Future    Ambulatory Referral to Orthopedic Surgery; Future    Weakness(flexion) of right hand    Orders:    MRI wrist right wo contrast; Future    Ambulatory Referral to Occupational Therapy; Future    Ambulatory Referral to Orthopedic Surgery; Future    Puncture wound of right wrist, initial encounter    Orders:    MRI wrist right wo contrast; Future    Ambulatory Referral to Occupational Therapy; Future    Ambulatory Referral to Orthopedic Surgery; Future       Return for Recheck with hand surgeon to review MRI.        History of Present Illness  This is a new patient who reports the right middle finger was injured during a dog bite in 2024.  Patient reports the dog bit his hand and wrist 3 separate times with multiple puncture  "wounds primarily in the region of the palm and wrist.  He reports he did not seek any medical treatment afterwards and it never became infected.  He reports he has had persistent weakness and loss of motion affecting the middle finger when trying to make a closed fist.  He reports his  strength is also been weak.  He is right-hand dominant.    Review of Systems  Pertinent items are noted in HPI.  All other systems were reviewed and are negative.    Physical Exam  Ht 5' 10\" (1.778 m)   Wt 87.1 kg (192 lb)   BMI 27.55 kg/m²   Cons: Appears well.  No apparent distress.  Psych: Alert. Oriented x3.  Mood and affect normal.    Patient's right hand wrist and fingers have no signs of active infection.  He has multiple healed scars on the anterior aspect of the right wrist distally.  He does not have any healed puncture wounds visualized in the palm or middle finger.  He is lacking 20 degrees of MCP and PIP joint flexion at the middle finger.  He has full range of motion at all other digits.  He is mildly tender at the PIP joint of the middle finger but has no pain or instability with UCL or RCL stressing.  He has no trigger finger appreciable on exam.  Wrist flexion strength is decreased to 4- out of 5.  Wrist extension strength is 5 out of 5.  Distal radial ulnar pulses are +2.  He has a negative Tinel's at the carpal tunnel and a negative Phalen's.  His capillary refill is normal to all digits.  He has sensation intact to light touch at all digits.          Studies Reviewed  Independent review of x-rays of the right hand in the office today show no acute fractures or dislocations.  No foreign bodies are noted.  X-rays were reviewed from an orthopedic standpoint will await official radiologist interpretation.      Procedures  No procedures today.    Medical, Surgical, Family, and Social History  The patient's medical history, family history, and social history, were reviewed and updated as appropriate.    History " reviewed. No pertinent past medical history.    History reviewed. No pertinent surgical history.    History reviewed. No pertinent family history.    Social History     Occupational History    Not on file   Tobacco Use    Smoking status: Former     Types: Cigarettes    Smokeless tobacco: Never   Substance and Sexual Activity    Alcohol use: Yes    Drug use: Never    Sexual activity: Not on file       No Known Allergies      Current Outpatient Medications:     atorvastatin (LIPITOR) 10 mg tablet, Take 10 mg by mouth daily, Disp: , Rfl:       Gary Camejo PA-C

## 2025-04-26 ENCOUNTER — OFFICE VISIT (OUTPATIENT)
Dept: OBGYN CLINIC | Facility: CLINIC | Age: 55
End: 2025-04-26
Payer: COMMERCIAL

## 2025-04-26 ENCOUNTER — APPOINTMENT (OUTPATIENT)
Dept: RADIOLOGY | Age: 55
End: 2025-04-26
Attending: PHYSICIAN ASSISTANT
Payer: COMMERCIAL

## 2025-04-26 VITALS — HEIGHT: 70 IN | WEIGHT: 192 LBS | BODY MASS INDEX: 27.49 KG/M2

## 2025-04-26 DIAGNOSIS — R29.898 WEAKNESS OF RIGHT HAND: ICD-10-CM
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
## 2025-04-26 DIAGNOSIS — M24.541 CONTRACTURE OF JOINT OF FINGER OF RIGHT HAND: ICD-10-CM

## 2025-04-26 DIAGNOSIS — S61.531A PUNCTURE WOUND OF RIGHT WRIST, INITIAL ENCOUNTER: ICD-10-CM

## 2025-04-26 PROCEDURE — 73130 X-RAY EXAM OF HAND: CPT

## 2025-04-26 PROCEDURE — 99203 OFFICE O/P NEW LOW 30 MIN: CPT | Performed by: PHYSICIAN ASSISTANT

## 2025-05-05 ENCOUNTER — EVALUATION (OUTPATIENT)
Dept: OCCUPATIONAL THERAPY | Age: 55
End: 2025-05-05
Attending: PHYSICIAN ASSISTANT
Payer: COMMERCIAL

## 2025-05-05 ENCOUNTER — HOSPITAL ENCOUNTER (OUTPATIENT)
Dept: RADIOLOGY | Facility: HOSPITAL | Age: 55
Discharge: HOME | End: 2025-05-05
Payer: COMMERCIAL

## 2025-05-05 DIAGNOSIS — S61.531A PUNCTURE WOUND OF RIGHT WRIST, INITIAL ENCOUNTER: ICD-10-CM

## 2025-05-05 DIAGNOSIS — R29.898 WEAKNESS OF RIGHT HAND: ICD-10-CM
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
## 2025-05-05 DIAGNOSIS — M24.541 CONTRACTURE OF JOINT OF FINGER OF RIGHT HAND: ICD-10-CM

## 2025-05-05 PROCEDURE — 73221 MRI JOINT UPR EXTREM W/O DYE: CPT

## 2025-05-05 PROCEDURE — 97165 OT EVAL LOW COMPLEX 30 MIN: CPT

## 2025-05-05 PROCEDURE — 97110 THERAPEUTIC EXERCISES: CPT

## 2025-05-05 NOTE — PROGRESS NOTES
OT Evaluation     Today's date: 2025  Patient name: Sesar Damico  : 1970  MRN: [id number]  Referring provider: Gary Camejo PA*  Dx:   Encounter Diagnosis     ICD-10-CM    1. Dog bite, sequela 2024  W54.0XXS Ambulatory Referral to Occupational Therapy      2. 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 Weakness(flexion) of right hand  R29.898 Ambulatory Referral to Occupational Therapy      4. Puncture wound of right wrist, initial encounter  S61.531A Ambulatory Referral to Occupational Therapy          Start Time: 1545  Stop Time: 1615  Total time in clinic (min): 30 minutes    Assessment  Impairments: impaired physical strength, lacks appropriate home exercise program and pain with function    Assessment details: Pt presents for OT eval with hx of R hand/wrist dog bite injury occurring in 2024.  Subjectively, he has some associated weakness with gripping tasks but otherwise has no issues with the hand. He has some pain on the dorsal side of his long finger middle phalanx with composite finger flexion. Objectively, he has weakness with  and pinch on the R compared to left. He has some extrinsic tightness on the R vs L. He has mild limitation with FDP gliding compared to his unaffected digits suggesting possible scar adhesion more proximally. OT recommending pt perform HEPs provided today until he gets MRI results. Will upgrade HEP at that time. Pt understands/agrees with current POC.   Understanding of Dx/Px/POC: good     Prognosis: good    Goals  Short term goals:  To be achieved within 2-3 visits from start of care on 25     Patient will demonstrate independence with recommended AROM and stretching HEPs.   Patient will demonstrate independence with postural/positional awareness and /ergonomic correction recommendations.  Patient will be instructed on the benefits and effective use of  appropriate modalities for edema control, soft-tissue extensibility, and pain management.   Patient will be instructed on the benefits and effective use of appropriate adaptive equipment and/or bracing devices.   Patient will be report improved functional participation with implementation of recommended pacing, activity modifications, and symptom management strategies.   Patient will report decreased pain throughout normal roles/routines to <1/10.   Patient will report improved ability to complete ADLs/IADLs compared to IE.   Patient will demonstrate improved  and pinch strength to within 5% of the uninvolved side for increased readiness to return to heavier lifting.   Patient will report readiness for d/c from OT.         Plan  Patient would benefit from: custom splinting and skilled occupational therapy  Referral necessary: Yes  Planned modality interventions: thermotherapy: hydrocollator packs    Planned therapy interventions: IADL retraining, activity modification, ADL retraining, behavior modification, body mechanics training, flexibility, functional ROM exercises, graded activity, graded exercise, home exercise program, therapeutic exercise, therapeutic activities, stretching, strengthening, orthotic fitting/training, orthotic management and training, patient education, manual therapy and joint mobilization    Frequency: Every other week  Duration in weeks: 4  Plan of Care beginning date: 5/5/2025  Plan of Care expiration date: 7/28/2025  Treatment plan discussed with: patient        Subjective Evaluation    History of Present Illness  Mechanism of injury: trauma  Mechanism of injury: Pt is a 55 yo RHD male who presents to OT for eval s/p dog bite injury affecting his right hand and wrist mostly long/ring fingers occurring about 6 months ago. Pt has difficulty making a full fist at the end-range.     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      Patient Goals  Patient goals for therapy: increased strength and decreased pain    Pain  No pain reported  Quality: dull ache and discomfort    Treatments  Current treatment: occupational therapy        Objective    Flowsheet Rows      Flowsheet Row Most Recent Value   PT/OT G-Codes    Current Score 83   Projected Score 83        Tissue Integrity: no open wounds, healed over scars from dog bite punture wounds    Sensation (Ten-Test )  Right Hand (thumb to small finger): 10/10, 10/10, 10/10, 10/10, 10/10  Left Hand (thumb to small finger): 10/10, 10/10, 10/10, 10/10, 101/10    Special Tests:   No contracture  No apparent tendon disruption  Possible scar adhesion - decreased FDP glide to long finger  + Extrinsic stiffness    Edema (circumferential) (cm):    Right Left   PIP Long finger  PIP Ring finger 7.2  6.4 6.7  5.7       AROM       Wrist   Right Left   Flexion 75 (42 with composite fist) 80 (65 with composite fist)       Right Hand (ext/flex)   D3 D4   MCP 70 90   PIP 90 105   DIP 66 68   Hook (DPC) full full   Full (DPC) full full       /Pinch Strength  Dynamometer R UE  L UE comments   Position #2 (lbs) 47.2 73.2     Pinch Meter          Lateral 17 18      3 JAW ELAINE 9 15      2-point 5  11.5              Precautions: dog bite injury    Manuals  5/5 eval                 STM/IASTM                                                                                        Ther Ex                       Wrist AROM                  Wrist stretches              Tendon glides Hook/full             Graded strengthening Pink putty HEP                                                                                                                                               Ther Activity                                                                                                                                         Neuro Re-Ed                                                               Ortho Fit                                                               Modalities                  HP

## 2025-05-21 ENCOUNTER — APPOINTMENT (OUTPATIENT)
Dept: OCCUPATIONAL THERAPY | Age: 55
End: 2025-05-21
Attending: PHYSICIAN ASSISTANT
Payer: COMMERCIAL

## 2025-06-06 ENCOUNTER — APPOINTMENT (OUTPATIENT)
Dept: LAB | Age: 55
End: 2025-06-06
Attending: NURSE PRACTITIONER
Payer: COMMERCIAL

## 2025-06-06 DIAGNOSIS — N40.1 ENLARGED PROSTATE WITH URINARY OBSTRUCTION: ICD-10-CM

## 2025-06-06 DIAGNOSIS — Z00.01 ENCOUNTER FOR GENERAL ADULT MEDICAL EXAMINATION WITH ABNORMAL FINDINGS: ICD-10-CM

## 2025-06-06 DIAGNOSIS — R12 HEARTBURN: ICD-10-CM

## 2025-06-06 DIAGNOSIS — E78.5 HYPERLIPIDEMIA, UNSPECIFIED HYPERLIPIDEMIA TYPE: ICD-10-CM

## 2025-06-06 DIAGNOSIS — N13.8 ENLARGED PROSTATE WITH URINARY OBSTRUCTION: ICD-10-CM

## 2025-06-06 LAB
ALBUMIN SERPL BCG-MCNC: 4.7 G/DL (ref 3.5–5)
ALP SERPL-CCNC: 59 U/L (ref 34–104)
ALT SERPL W P-5'-P-CCNC: 21 U/L (ref 7–52)
ANION GAP SERPL CALCULATED.3IONS-SCNC: 8 MMOL/L (ref 4–13)
AST SERPL W P-5'-P-CCNC: 21 U/L (ref 13–39)
BILIRUB SERPL-MCNC: 0.63 MG/DL (ref 0.2–1)
BUN SERPL-MCNC: 15 MG/DL (ref 5–25)
CALCIUM SERPL-MCNC: 9 MG/DL (ref 8.4–10.2)
CHLORIDE SERPL-SCNC: 104 MMOL/L (ref 96–108)
CHOLEST SERPL-MCNC: 173 MG/DL (ref ?–200)
CO2 SERPL-SCNC: 28 MMOL/L (ref 21–32)
CREAT SERPL-MCNC: 0.99 MG/DL (ref 0.6–1.3)
GFR SERPL CREATININE-BSD FRML MDRD: 85 ML/MIN/1.73SQ M
GLUCOSE P FAST SERPL-MCNC: 99 MG/DL (ref 65–99)
HDLC SERPL-MCNC: 51 MG/DL
LDLC SERPL CALC-MCNC: 113 MG/DL (ref 0–100)
NONHDLC SERPL-MCNC: 122 MG/DL
POTASSIUM SERPL-SCNC: 4.6 MMOL/L (ref 3.5–5.3)
PROT SERPL-MCNC: 7.5 G/DL (ref 6.4–8.4)
PSA SERPL-MCNC: 0.51 NG/ML (ref 0–4)
SODIUM SERPL-SCNC: 140 MMOL/L (ref 135–147)
TRIGL SERPL-MCNC: 47 MG/DL (ref ?–150)

## 2025-06-06 PROCEDURE — 84153 ASSAY OF PSA TOTAL: CPT

## 2025-06-06 PROCEDURE — 80053 COMPREHEN METABOLIC PANEL: CPT

## 2025-06-06 PROCEDURE — 36415 COLL VENOUS BLD VENIPUNCTURE: CPT

## 2025-06-06 PROCEDURE — 80061 LIPID PANEL: CPT
